# Patient Record
Sex: MALE | Race: WHITE | Employment: OTHER | URBAN - METROPOLITAN AREA
[De-identification: names, ages, dates, MRNs, and addresses within clinical notes are randomized per-mention and may not be internally consistent; named-entity substitution may affect disease eponyms.]

---

## 2020-12-24 ENCOUNTER — NEW REFERRAL (OUTPATIENT)
Dept: URBAN - METROPOLITAN AREA CLINIC 26 | Facility: CLINIC | Age: 80
End: 2020-12-24

## 2020-12-24 VITALS
BODY MASS INDEX: 26.6 KG/M2 | WEIGHT: 190 LBS | HEART RATE: 56 BPM | SYSTOLIC BLOOD PRESSURE: 151 MMHG | HEIGHT: 71 IN | DIASTOLIC BLOOD PRESSURE: 80 MMHG

## 2020-12-24 DIAGNOSIS — H40.9: ICD-10-CM

## 2020-12-24 DIAGNOSIS — H43.11: ICD-10-CM

## 2020-12-24 DIAGNOSIS — H02.834: ICD-10-CM

## 2020-12-24 DIAGNOSIS — H35.372: ICD-10-CM

## 2020-12-24 DIAGNOSIS — H43.392: ICD-10-CM

## 2020-12-24 DIAGNOSIS — H04.123: ICD-10-CM

## 2020-12-24 DIAGNOSIS — H02.831: ICD-10-CM

## 2020-12-24 PROCEDURE — 99204 OFFICE O/P NEW MOD 45 MIN: CPT

## 2020-12-24 PROCEDURE — 76512 OPH US DX B-SCAN: CPT

## 2020-12-24 PROCEDURE — 92250 FUNDUS PHOTOGRAPHY W/I&R: CPT

## 2020-12-24 ASSESSMENT — TONOMETRY
OD_IOP_MMHG: 15
OS_IOP_MMHG: 16

## 2020-12-24 ASSESSMENT — VISUAL ACUITY
OS_SC: 20/50-2
OD_SC: 20/80-1

## 2021-01-21 ENCOUNTER — FOLLOW UP (OUTPATIENT)
Dept: URBAN - METROPOLITAN AREA CLINIC 26 | Facility: CLINIC | Age: 81
End: 2021-01-21

## 2021-01-21 VITALS
WEIGHT: 190 LBS | HEART RATE: 46 BPM | DIASTOLIC BLOOD PRESSURE: 69 MMHG | HEIGHT: 71 IN | BODY MASS INDEX: 26.6 KG/M2 | SYSTOLIC BLOOD PRESSURE: 131 MMHG

## 2021-01-21 DIAGNOSIS — H35.372: ICD-10-CM

## 2021-01-21 DIAGNOSIS — H40.9: ICD-10-CM

## 2021-01-21 DIAGNOSIS — H31.091: ICD-10-CM

## 2021-01-21 DIAGNOSIS — H43.392: ICD-10-CM

## 2021-01-21 DIAGNOSIS — H35.3132: ICD-10-CM

## 2021-01-21 DIAGNOSIS — H43.11: ICD-10-CM

## 2021-01-21 PROCEDURE — 92250 FUNDUS PHOTOGRAPHY W/I&R: CPT

## 2021-01-21 PROCEDURE — 92014 COMPRE OPH EXAM EST PT 1/>: CPT

## 2021-01-21 PROCEDURE — 92235 FLUORESCEIN ANGRPH MLTIFRAME: CPT

## 2021-01-21 ASSESSMENT — TONOMETRY
OD_IOP_MMHG: 11
OS_IOP_MMHG: 12

## 2021-01-21 ASSESSMENT — VISUAL ACUITY
OS_SC: 20/25+1
OD_SC: 20/50

## 2023-02-15 ENCOUNTER — APPOINTMENT (RX ONLY)
Dept: URBAN - METROPOLITAN AREA CLINIC 333 | Facility: CLINIC | Age: 83
Setting detail: DERMATOLOGY
End: 2023-02-15

## 2023-02-15 DIAGNOSIS — L72.8 OTHER FOLLICULAR CYSTS OF THE SKIN AND SUBCUTANEOUS TISSUE: ICD-10-CM | Status: INADEQUATELY CONTROLLED

## 2023-02-15 DIAGNOSIS — L72.0 EPIDERMAL CYST: ICD-10-CM

## 2023-02-15 PROCEDURE — 10040 EXTRACTION: CPT

## 2023-02-15 PROCEDURE — ? ACNE SURGERY

## 2023-02-15 PROCEDURE — 99202 OFFICE O/P NEW SF 15 MIN: CPT | Mod: 25

## 2023-02-15 PROCEDURE — ? PRESCRIPTION MEDICATION MANAGEMENT

## 2023-02-15 PROCEDURE — ? COUNSELING

## 2023-02-15 ASSESSMENT — LOCATION DETAILED DESCRIPTION DERM
LOCATION DETAILED: LEFT INFERIOR MEDIAL MALAR CHEEK
LOCATION DETAILED: LEFT INFERIOR CENTRAL MALAR CHEEK
LOCATION DETAILED: LEFT CENTRAL MALAR CHEEK

## 2023-02-15 ASSESSMENT — LOCATION SIMPLE DESCRIPTION DERM: LOCATION SIMPLE: LEFT CHEEK

## 2023-02-15 ASSESSMENT — LOCATION ZONE DERM: LOCATION ZONE: FACE

## 2023-02-15 NOTE — PROCEDURE: ACNE SURGERY
Render Number Of Lesions Treated: no
Acne Type: Cysts
Extraction Method: physical extraction
Consent: verbal Consent was obtained and risks were reviewed including but not limited to scarring, infection, bleeding, scabbing, incomplete removal, and allergy to anesthesia.
Detail Level: Detailed
Prep Text (Optional): Prior to removal the treatment areas were prepped in the usual fashion.

## 2023-02-15 NOTE — PROCEDURE: PRESCRIPTION MEDICATION MANAGEMENT
Detail Level: Zone
Render In Strict Bullet Format?: No
Initiate Treatment: #2 acne moisturizing cream \\nApply nightly to the cheeks as tolerated

## 2023-11-15 ENCOUNTER — P2P PATIENT RECORD (OUTPATIENT)
Age: 83
End: 2023-11-15

## 2023-12-22 ENCOUNTER — TELEPHONE ENCOUNTER (OUTPATIENT)
Dept: URBAN - METROPOLITAN AREA CLINIC 7 | Facility: CLINIC | Age: 83
End: 2023-12-22

## 2024-01-08 PROBLEM — 709044004: Status: ACTIVE | Noted: 2024-01-08

## 2024-01-09 ENCOUNTER — LAB OUTSIDE AN ENCOUNTER (OUTPATIENT)
Dept: URBAN - METROPOLITAN AREA CLINIC 7 | Facility: CLINIC | Age: 84
End: 2024-01-09

## 2024-01-09 ENCOUNTER — DASHBOARD ENCOUNTERS (OUTPATIENT)
Age: 84
End: 2024-01-09

## 2024-01-09 ENCOUNTER — TELEPHONE ENCOUNTER (OUTPATIENT)
Dept: URBAN - METROPOLITAN AREA CLINIC 7 | Facility: CLINIC | Age: 84
End: 2024-01-09

## 2024-01-09 ENCOUNTER — OFFICE VISIT (OUTPATIENT)
Dept: URBAN - METROPOLITAN AREA CLINIC 7 | Facility: CLINIC | Age: 84
End: 2024-01-09
Payer: MEDICARE

## 2024-01-09 VITALS
HEIGHT: 71 IN | WEIGHT: 203 LBS | SYSTOLIC BLOOD PRESSURE: 122 MMHG | BODY MASS INDEX: 28.42 KG/M2 | TEMPERATURE: 97.8 F | DIASTOLIC BLOOD PRESSURE: 76 MMHG

## 2024-01-09 DIAGNOSIS — D69.6 THROMBOCYTOPENIA: ICD-10-CM

## 2024-01-09 DIAGNOSIS — N18.9 CHRONIC KIDNEY DISEASE, UNSPECIFIED CKD STAGE: ICD-10-CM

## 2024-01-09 DIAGNOSIS — K70.31 ALCOHOLIC CIRRHOSIS OF LIVER WITH ASCITES: ICD-10-CM

## 2024-01-09 PROCEDURE — 99204 OFFICE O/P NEW MOD 45 MIN: CPT | Performed by: INTERNAL MEDICINE

## 2024-01-09 RX ORDER — FLUTICASONE PROPIONATE 50 UG/1
SPRAY, METERED NASAL
Qty: 16 GRAM | Status: ACTIVE | COMMUNITY

## 2024-01-09 RX ORDER — SIMVASTATIN 40 MG/1
TABLET, FILM COATED ORAL
Qty: 90 TABLET | Status: ACTIVE | COMMUNITY

## 2024-01-09 RX ORDER — POTASSIUM CHLORIDE 10 MEQ/1
CAPSULE, COATED, EXTENDED RELEASE ORAL
Qty: 5 CAPSULE | Status: ACTIVE | COMMUNITY

## 2024-01-09 RX ORDER — PROPRANOLOL HYDROCHLORIDE 10 MG/1
TABLET ORAL
Qty: 180 TABLET | Status: ACTIVE | COMMUNITY

## 2024-01-09 RX ORDER — GENTAMICIN SULFATE 1 MG/G
APPLY TO SURGICAL SITE EVERY DAY WITH EVERY BANDAGE CHANGE UNTIL HEALED OINTMENT TOPICAL
Qty: 15 GRAM | Refills: 0 | Status: ACTIVE | COMMUNITY

## 2024-01-09 RX ORDER — VALSARTAN 80 MG/1
TABLET, FILM COATED ORAL
Qty: 90 TABLET | Status: ACTIVE | COMMUNITY

## 2024-01-09 RX ORDER — TAMSULOSIN HYDROCHLORIDE 0.4 MG/1
CAPSULE ORAL
Qty: 90 CAPSULE | Status: ACTIVE | COMMUNITY

## 2024-01-09 RX ORDER — SPIRONOLACTONE 25 MG/1
TABLET ORAL
Qty: 45 TABLET | Status: ACTIVE | COMMUNITY

## 2024-01-09 RX ORDER — TORSEMIDE 10 MG/1
TAKE 2 TABLETS BY MOUTH TWICE DAILY TABLET ORAL
Qty: 340 EACH | Refills: 0 | Status: ACTIVE | COMMUNITY

## 2024-01-09 RX ORDER — LEVOTHYROXINE SODIUM 0.03 MG/1
TAKE 1 TABLET BY MOUTH EVERY DAY TABLET ORAL
Qty: 90 EACH | Refills: 1 | Status: ACTIVE | COMMUNITY

## 2024-01-09 RX ORDER — FOLIC ACID 1 MG/1
TABLET ORAL
Qty: 90 TABLET | Status: ACTIVE | COMMUNITY

## 2024-01-09 NOTE — HPI-TODAY'S VISIT:
Patient is new to the practice and is being evaluated for ascites.  I did get a notification from his nephrologist stating that the patient needed to have a paracentesis done due to worsening ascites and this was back on December 22, 2023.  I did advise that we could place an order for diagnostic and therapeutic paracentesis but because of the timing up against the weekend and the holiday that would be unlikely that this could be done in a rapid fashion.  Thus I recommended that the patient proceed to the ER for more acute decompression.  The patient did have CT imaging back in 2015 and 2017 which showed splenomegaly and changes consistent with liver cirrhosis.  Does have a history of heavy alcohol use and a history of Crawford's esophagus.  He has been followed by [facility] for history of macrocytic anemia and thrombocytopenia as well as leukopenia and bone marrow biopsy in the past without any evidence of lymphoproliferative disorder.  He does have a history of hemolytic anemia as well treated with steroids in the past.  Seems he has had issues with lower extremity edema and has been on diuretics and the possibility of dialysis has been discussed.  Labs in December 2023 demonstrated hemoglobin of 10, platelets of 102, and a white count of 3.4.  Creatinine 1.7.  AST 27, ALT 14, albumin 2.6, bilirubin 1.6.  He has had issues with recurrent sinusitis and has been evaluated by ENT for possible surgery for this. He did have ascites tapped in December, first time, 4 liters taken off. On aldactone 25, torsemide 10 mg daily. Belly is getting distended, no GI bleeding. No jaundice. He tells me he was following with GI at [facility]. Last colonoscopy was 2014. EGD may have been done in 2014. He tells me he may have had varices

## 2024-01-10 ENCOUNTER — LAB OUTSIDE AN ENCOUNTER (OUTPATIENT)
Dept: URBAN - METROPOLITAN AREA CLINIC 7 | Facility: CLINIC | Age: 84
End: 2024-01-10

## 2024-01-22 ENCOUNTER — TELEPHONE ENCOUNTER (OUTPATIENT)
Dept: URBAN - METROPOLITAN AREA CLINIC 7 | Facility: CLINIC | Age: 84
End: 2024-01-22

## 2024-01-23 ENCOUNTER — TELEPHONE ENCOUNTER (OUTPATIENT)
Dept: URBAN - METROPOLITAN AREA CLINIC 7 | Facility: CLINIC | Age: 84
End: 2024-01-23

## 2024-01-29 LAB
A/G RATIO: 0.7
ABSOLUTE BASOPHILS: 37
ABSOLUTE EOSINOPHILS: 148
ABSOLUTE LYMPHOCYTES: 481
ABSOLUTE MONOCYTES: 222
ABSOLUTE NEUTROPHILS: 2812
AFP, SERUM, TUMOR MARKER: 2.4
ALBUMIN: 2.7
ALKALINE PHOSPHATASE: 65
ALT (SGPT): 15
AST (SGOT): 25
BASOPHILS: 1
BILIRUBIN, TOTAL: 1.3
BUN/CREATININE RATIO: 13
BUN: 28
CALCIUM: 8.6
CARBON DIOXIDE, TOTAL: 31
CHLORIDE: 102
CREATININE: 2.08
EGFR: 31
EOSINOPHILS: 4
GLOBULIN, TOTAL: 3.7
GLUCOSE: 88
HEMATOCRIT: 28.1
HEMOGLOBIN: 9.7
INR: 1.3
LYMPHOCYTES: 13
MCH: 33.3
MCHC: 34.5
MCV: 96.6
MONOCYTES: 6
MPV: 9.7
NEUTROPHILS: 76
PLATELET COUNT: 97
POTASSIUM: 3.3
PROTEIN, TOTAL: 6.4
PT: 13.6
RDW: 13
RED BLOOD CELL COUNT: 2.91
SODIUM: 141
WHITE BLOOD CELL COUNT: 3.7

## 2024-04-23 ENCOUNTER — LAB (OUTPATIENT)
Dept: URBAN - METROPOLITAN AREA CLINIC 7 | Facility: CLINIC | Age: 84
End: 2024-04-23

## 2024-05-08 ENCOUNTER — APPOINTMENT (RX ONLY)
Dept: URBAN - METROPOLITAN AREA CLINIC 333 | Facility: CLINIC | Age: 84
Setting detail: DERMATOLOGY
End: 2024-05-08

## 2024-05-08 DIAGNOSIS — D22 MELANOCYTIC NEVI: ICD-10-CM

## 2024-05-08 DIAGNOSIS — L57.0 ACTINIC KERATOSIS: ICD-10-CM | Status: INADEQUATELY CONTROLLED

## 2024-05-08 DIAGNOSIS — L82.1 OTHER SEBORRHEIC KERATOSIS: ICD-10-CM

## 2024-05-08 DIAGNOSIS — L72.8 OTHER FOLLICULAR CYSTS OF THE SKIN AND SUBCUTANEOUS TISSUE: ICD-10-CM | Status: STABLE

## 2024-05-08 DIAGNOSIS — Z85.828 PERSONAL HISTORY OF OTHER MALIGNANT NEOPLASM OF SKIN: ICD-10-CM

## 2024-05-08 DIAGNOSIS — L81.4 OTHER MELANIN HYPERPIGMENTATION: ICD-10-CM

## 2024-05-08 DIAGNOSIS — L57.8 OTHER SKIN CHANGES DUE TO CHRONIC EXPOSURE TO NONIONIZING RADIATION: ICD-10-CM

## 2024-05-08 DIAGNOSIS — L85.3 XEROSIS CUTIS: ICD-10-CM

## 2024-05-08 PROBLEM — D22.5 MELANOCYTIC NEVI OF TRUNK: Status: ACTIVE | Noted: 2024-05-08

## 2024-05-08 PROCEDURE — ? COUNSELING

## 2024-05-08 PROCEDURE — ? ADDITIONAL NOTES

## 2024-05-08 PROCEDURE — ? FULL BODY SKIN EXAM

## 2024-05-08 PROCEDURE — ? TREATMENT REGIMEN

## 2024-05-08 PROCEDURE — 17003 DESTRUCT PREMALG LES 2-14: CPT

## 2024-05-08 PROCEDURE — ? LIQUID NITROGEN

## 2024-05-08 PROCEDURE — 17000 DESTRUCT PREMALG LESION: CPT

## 2024-05-08 PROCEDURE — 99213 OFFICE O/P EST LOW 20 MIN: CPT | Mod: 25

## 2024-05-08 ASSESSMENT — LOCATION DETAILED DESCRIPTION DERM
LOCATION DETAILED: LEFT SUPERIOR MEDIAL FOREHEAD
LOCATION DETAILED: RIGHT SUPERIOR MEDIAL UPPER BACK
LOCATION DETAILED: RIGHT ANTERIOR DISTAL UPPER ARM
LOCATION DETAILED: LEFT CENTRAL PARIETAL SCALP
LOCATION DETAILED: LEFT CENTRAL FRONTAL SCALP
LOCATION DETAILED: INFERIOR THORACIC SPINE

## 2024-05-08 ASSESSMENT — LOCATION SIMPLE DESCRIPTION DERM
LOCATION SIMPLE: SCALP
LOCATION SIMPLE: LEFT FOREHEAD
LOCATION SIMPLE: RIGHT UPPER ARM
LOCATION SIMPLE: RIGHT UPPER BACK
LOCATION SIMPLE: UPPER BACK

## 2024-05-08 ASSESSMENT — LOCATION ZONE DERM
LOCATION ZONE: TRUNK
LOCATION ZONE: FACE
LOCATION ZONE: SCALP
LOCATION ZONE: ARM

## 2024-05-08 NOTE — PROCEDURE: COUNSELING
Detail Level: Generalized
Detail Level: Detailed
Patient Specific Counseling (Will Not Stick From Patient To Patient): **\\nPatient aware if he wants removal, can do surgical removal with sutures.
Detail Level: Simple

## 2024-05-08 NOTE — PROCEDURE: LIQUID NITROGEN
Consent: The patient's consent was obtained including but not limited to risks of crusting, scabbing, blistering, scarring, darker or lighter pigmentary change, recurrence, incomplete removal and infection.
Render Post-Care Instructions In Note?: no
Detail Level: Detailed
Post-Care Instructions: Aftercare Cryosurgery\\nYou have just had cryotherapy for the treatment of a pre-cancerous. You can expect the pain to rapidly decrease over the next 45 minutes. The area treated will initially turn red and over the next 72 hours turn brown to black. A scab will form that will peel off by itself within 5 to 7 days. A blister or reddish-purple blood blister may form where the area has been treated. When the scab forms, you can apply Vaseline or Scar Recovery Gel twice a day to the wound. This product will improve the healing of the wound, decreasing the appearance of red or pink pigment changes in the wound. The gel has also been shown to significantly decrease itching while the wound heals. You can purchase the Scar Recovery Gel at our office.\\n*Can I wash or use makeup? Yes\\n*Should I break the blister should one form? *If the blister is bothersome, you may break the blister with a clean needle if the lesion is on the body. However, we do not recommend doing this for lesions on the face. Keep the area dry and as clean as possible in order to prevent infection. Natural skin is the best protection to prevent infection.\\n*Will this leave a scar? It could, but it is very unlikely. However, it may leave a slight discoloration, which should be temporary.\\n*What if the skin growth doesn't go away after this has healed? The providers treated this area believing it did not have cancerous roots and was strictly limited to the surface of the skin as a pre-cancerous or benign growth would be. The growth may not disappear or it may return over a period of time. You need to have this area checked again at your follow-up appointment to ensure that it does not need further treatment or that it has resolved.
Show Applicator Variable?: Yes
Duration Of Freeze Thaw-Cycle (Seconds): 0

## 2024-05-08 NOTE — PROCEDURE: ADDITIONAL NOTES
Render Risk Assessment In Note?: no
Additional Notes: Discussed #9 Xerosis gel from [facility].
Detail Level: Simple

## 2024-05-14 ENCOUNTER — RX ONLY (OUTPATIENT)
Age: 84
Setting detail: RX ONLY
End: 2024-05-14

## 2024-05-14 RX ORDER — LACTIC ACID/UREA 10 %-40 %
1 GEL (GRAM) TOPICAL QD
Qty: 120 | Refills: 3 | Status: ERX | COMMUNITY
Start: 2024-05-14

## 2024-07-01 ENCOUNTER — TELEPHONE ENCOUNTER (OUTPATIENT)
Dept: URBAN - METROPOLITAN AREA CLINIC 7 | Facility: CLINIC | Age: 84
End: 2024-07-01

## 2024-07-01 ENCOUNTER — LAB OUTSIDE AN ENCOUNTER (OUTPATIENT)
Dept: URBAN - METROPOLITAN AREA CLINIC 7 | Facility: CLINIC | Age: 84
End: 2024-07-01

## 2024-07-01 ENCOUNTER — OFFICE VISIT (OUTPATIENT)
Dept: URBAN - METROPOLITAN AREA CLINIC 7 | Facility: CLINIC | Age: 84
End: 2024-07-01
Payer: MEDICARE

## 2024-07-01 VITALS
RESPIRATION RATE: 16 BRPM | HEIGHT: 71 IN | BODY MASS INDEX: 27.44 KG/M2 | WEIGHT: 196 LBS | SYSTOLIC BLOOD PRESSURE: 120 MMHG | DIASTOLIC BLOOD PRESSURE: 70 MMHG | TEMPERATURE: 97.7 F

## 2024-07-01 DIAGNOSIS — D69.6 THROMBOCYTOPENIA: ICD-10-CM

## 2024-07-01 DIAGNOSIS — K70.31 ALCOHOLIC CIRRHOSIS OF LIVER WITH ASCITES: ICD-10-CM

## 2024-07-01 DIAGNOSIS — N18.9 CHRONIC KIDNEY DISEASE, UNSPECIFIED CKD STAGE: ICD-10-CM

## 2024-07-01 PROCEDURE — 99214 OFFICE O/P EST MOD 30 MIN: CPT | Performed by: INTERNAL MEDICINE

## 2024-07-01 RX ORDER — SPIRONOLACTONE 25 MG/1
TABLET ORAL
Qty: 45 TABLET | Status: ACTIVE | COMMUNITY

## 2024-07-01 RX ORDER — TORSEMIDE 10 MG/1
TAKE 2 TABLETS BY MOUTH TWICE DAILY TABLET ORAL
Qty: 340 EACH | Refills: 0 | Status: ACTIVE | COMMUNITY

## 2024-07-01 RX ORDER — FLUTICASONE PROPIONATE 50 UG/1
SPRAY, METERED NASAL
Qty: 16 GRAM | Status: ACTIVE | COMMUNITY

## 2024-07-01 RX ORDER — FOLIC ACID 1 MG/1
TABLET ORAL
Qty: 90 TABLET | Status: ACTIVE | COMMUNITY

## 2024-07-01 RX ORDER — LEVOTHYROXINE SODIUM 0.03 MG/1
TAKE 1 TABLET BY MOUTH EVERY DAY TABLET ORAL
Qty: 90 EACH | Refills: 1 | Status: ACTIVE | COMMUNITY

## 2024-07-01 RX ORDER — TAMSULOSIN HYDROCHLORIDE 0.4 MG/1
CAPSULE ORAL
Qty: 90 CAPSULE | Status: ACTIVE | COMMUNITY

## 2024-07-01 RX ORDER — GENTAMICIN SULFATE 1 MG/G
APPLY TO SURGICAL SITE EVERY DAY WITH EVERY BANDAGE CHANGE UNTIL HEALED OINTMENT TOPICAL
Qty: 15 GRAM | Refills: 0 | Status: ACTIVE | COMMUNITY

## 2024-07-01 RX ORDER — VALSARTAN 80 MG/1
TABLET, FILM COATED ORAL
Qty: 90 TABLET | Status: ACTIVE | COMMUNITY

## 2024-07-01 RX ORDER — PROPRANOLOL HYDROCHLORIDE 10 MG/1
TABLET ORAL
Qty: 180 TABLET | Status: ACTIVE | COMMUNITY

## 2024-07-01 RX ORDER — POTASSIUM CHLORIDE 10 MEQ/1
CAPSULE, COATED, EXTENDED RELEASE ORAL
Qty: 5 CAPSULE | Status: ACTIVE | COMMUNITY

## 2024-07-01 RX ORDER — SIMVASTATIN 40 MG/1
TABLET, FILM COATED ORAL
Qty: 90 TABLET | Status: ACTIVE | COMMUNITY

## 2024-07-01 NOTE — HPI-TODAY'S VISIT:
LV 1/2024. I did get a notification from his nephrologist stating that the patient needed to have a paracentesis done due to worsening ascites and this was back on December 22, 2023.  I did advise that we could place an order for diagnostic and therapeutic paracentesis but because of the timing up against the holiday that would be unlikely that this could be done in an outpatient setting.  Thus I recommended that the patient proceed to the ER for more acute decompression.  The patient did have CT imaging back in 2015 and 2017 which showed splenomegaly and changes consistent with liver cirrhosis.  Does have a history of heavy alcohol use and a history of Crawford's esophagus.  He has been followed by [facility] for history of macrocytic anemia and thrombocytopenia as well as leukopenia and bone marrow biopsy in the past without any evidence of lymphoproliferative disorder.  He does have a history of hemolytic anemia as well treated with steroids in the past.  Seems he has had issues with lower extremity edema and has been on diuretics and the possibility of dialysis has been discussed.  Labs in December 2023 demonstrated hemoglobin of 10, platelets of 102, and a white count of 3.4.  Creatinine 1.7.  AST 27, ALT 14, albumin 2.6, bilirubin 1.6.  He has had issues with recurrent sinusitis and has been evaluated by ENT for possible surgery for this. He did have ascites tapped in December, first time, 4 liters taken off. On aldactone 25, torsemide 10 mg daily. Belly is getting distended, no GI bleeding. No jaundice. He tells me he was following with GI at [facility] in the past. Last colonoscopy was 2014. EGD may have been done in 2014. He tells me he may have had varices. Plan was paracentesis as needed with albumin as he has decompensated cirrhosis at this point, will get MELD labs, and get MRI liver to screen for HCC. He was not drinking alcohol anymore.  Labs in January 2024 demonstrated a hemoglobin of 9.7 white count 3.7, platelets 97.  INR 1.3, AFP 2.4, creatinine 2.08, bilirubin 1.3 with otherwise normal LFTs.  He was being followed by nephrology and was on torsemide and spironolactone.  He did ultimately have an MRI of the liver done which was negative for any suspicious liver lesions and a repeat MRI liver was recommended in 6 months.  This study was somewhat limited due to ascites and so I did recommend that prior to his next MRI he get a paracentesis the week a few days prior.  Paracentesis in late May 2024 demonstrated 9 L of removed ascites fluid.  Labs May 2024 demonstrated a hemoglobin of 10.1, platelets 134, INR 1.49, sodium 137, creatinine 2.05, bilirubin 1.8 with otherwise normal LFTs.  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  No indication for treatment as of yet.  He did undergo sinus surgery by ENT in late May 2024. MELD-Na 20. Fu now. He is feeling distended, taking torsemide 30 mg in the AM, takes 1/2 tab of spironolactone. Feels like he needs more frequent paracenteses done. Did have one in mid-June 2024, and end of May with 9 liters drained.